# Patient Record
Sex: MALE | Race: WHITE | Employment: FULL TIME | URBAN - METROPOLITAN AREA
[De-identification: names, ages, dates, MRNs, and addresses within clinical notes are randomized per-mention and may not be internally consistent; named-entity substitution may affect disease eponyms.]

---

## 2018-08-23 ENCOUNTER — HOSPITAL ENCOUNTER (OUTPATIENT)
Dept: PHYSICAL THERAPY | Age: 38
Discharge: HOME | End: 2018-08-23
Payer: COMMERCIAL

## 2018-08-23 PROCEDURE — 97110 THERAPEUTIC EXERCISES: CPT

## 2018-08-23 PROCEDURE — 97161 PT EVAL LOW COMPLEX 20 MIN: CPT

## 2018-08-23 NOTE — PROGRESS NOTES
[address]  [facility] CLINIC [place] PHYSICAL THERAPY  [facility]  [address], [address] ,[address], [address]  Phone: [phone]  Fax: [phone] / [address] [address] PHYSICAL THERAPY SERVICES  Patient Name: Cuco Kirkpatrick : 1980   Medical   Diagnosis: Right knee pain [M25.561] Treatment Diagnosis: R knee pain   Onset Date: 2017     Referral Source: Tanisha Dee MD Start of [facility]): 2018   Prior Hospitalization: See medical history Provider #: [id number]   Prior Level of Function: Limited with ADLs due to pain   Comorbidities: R ankle tendonectoy Dec 2016, L shoulder labral repair    Medications: Verified on Patient Summary List   The Plan of Care and following information is based on the information from the initial evaluation.   ==================================================================================  Assessment / key information:  Patient is a 45 y.o. male who presents to [facility] with s/p R knee scope for partial medial meniscectomy & debridement of chondral lesion of [address] on 18 . Patient reports initial onset of sx possibly last 2017 when he fell in the shower at home. Patient reports sx are intermittent in nature with worsening of sx with overactivit & prolonged weightbearing. He DC use of crutches after 2 days. Sx improve with use ibuprofen/alleve or tylenol for pain relief as well as use of CP on a daily basis. Average reported pain level at 5/10, 5/10 at worst & 0/10 at best. Upon objective evaluation patient demonstrates R -2 deg of hyperextension and 90 deg of flexion as measured in supine, L knee -6 to 140 deg. Mild ERP with flexion>extension both active & passively. Mild TTP along medial joint line. MMT revealed overall R knee strength at 4/5 with no c/o pain upon MMT. Patient's surgical dressing was removed, sutures are intact & appear to be healing well.   Pt ambulates with shortened step length on R, v/c for TKE on R LE. He is able to neg 3 stairs with nonrec pattern & 1 HR for safety. Patient can benefit from PT interventions to improve ROM, strength, decrease pain & swelling & normalize gait mechanics, to facilitate ADLs & overall functional status.   ==================================================================================  Eval Complexity: History MEDIUM  Complexity : 1-2 comorbidities / personal factors will impact the outcome/ POC ;  Examination  MEDIUM Complexity : 3 Standardized tests and measures addressing body structure, function, activity limitation and / or participation in recreation ; Presentation LOW Complexity : Stable, uncomplicated ;  Decision Making MEDIUM Complexity : FOTO score of 26-74; Overall Complexity LOW   Problem List: pain affecting function, decrease ROM, decrease strength, edema affecting function, impaired gait/ balance, decrease ADL/ functional abilitiies, decrease activity tolerance, decrease flexibility/ joint mobility, decrease transfer abilities and other FOTO 32 points   Treatment Plan may include any combination of the following: Therapeutic exercise, Therapeutic activities, Neuromuscular re-education, Physical agent/modality, Gait/balance training, Manual therapy, Aquatic therapy, Patient education, Self Care training, Functional mobility training, Home safety training and Stair training  Patient / Family readiness to learn indicated by: asking questions, trying to perform skills and interest  Persons(s) to be included in education: patient (P)  Barriers to Learning/Limitations: None  Measures taken:    Patient Goal (s): \"range of motion, return to running\"   Patient self reported health status: good  Rehabilitation Potential: good   Short Term Goals: To be accomplished in  2  weeks:  1) Establish HEP to prevent further disability.     2) Patient will report decreased c/o pain to < or = 3-4/10 to facilitate return to pain-free sport with manageable sx in R knee. 3) Patient will improve R knee AROM to 115 degrees of flexion so ROM is available for dressing activities. 4) Improve FOTO score from 32 points to > or = 42 points indicating improved tolerance with ADLs in regards to work at [employer].  Long Term Goals: To be accomplished in  4  weeks:  1) Patient will improve R knee AROM to 135 degrees of flexion so ROM is available for dressing activities. 2) Improve overall strength of R knee to 5-/5 with no c/o pain upon MMT so strength is available for return to eventual sport. 3) Improve FOTO score from 42 points to > or = 60 points indicating improved tolerance with ADLs in regards to work at [employer]. 4) Patient to be able to perform FWD step down from 6 inch step with good pelvis/knee stability & no c/o pain in preparation for return to eventual sport. Frequency / Duration:   Patient to be seen  2  times per week for 4  weeks:  Patient / Caregiver education and instruction: self care, activity modification, brace/ splint application and exercises  G-Codes (GP): NA  Therapist Signature: TRACEY Barrios, cert MDT Date: 4/80/4418   Certification Period: NA Time: 2:46 PM   ===========================================================================================  I certify that the above Physical Therapy Services are being furnished while the patient is under my care. I agree with the treatment plan and certify that this therapy is necessary. Physician Signature:        Date:       Time:     Please sign and return to [facility] or you may fax the signed copy to [phone]. Thank you.

## 2018-08-23 NOTE — PROGRESS NOTES
PHYSICAL THERAPY - DAILY TREATMENT NOTE    Patient Name: Wes Rodriguez        Date: 2018  : 1980   YES Patient  Verified  Visit #:      12  Insurance: Payor: Cindy Orozco / [place] / Product Type: PPO /      In time: 2:40 P Out time: 3:35 P   Total Treatment Time: 55     Medicare Time Tracking (below)   Total Timed Codes (min):  NA 1:1 Treatment Time:  NA     TREATMENT AREA =  Right knee pain [M25.561]    SUBJECTIVE  Pain Level (on 0 to 10 scale):  0  / 10   Medication Changes/New allergies or changes in medical history, any new surgeries or procedures?     NO    If yes, update Summary List   Subjective Functional Status/Changes:  []  No changes reported     See POC          OBJECTIVE  Modalities Rationale:     decrease edema and decrease pain to improve patient's ability to return to pain-free ambulation    min [] Estim, type/location:                                      []  att     []  unatt     []  w/US     []  w/ice    []  w/heat    min []  Mechanical Traction: type/lbs                   []  pro   []  sup   []  int   []  cont    []  before manual    []  after manual    min []  Ultrasound, settings/location:      min []  Iontophoresis w/ dexamethasone, location:                                               []  take home patch       []  in clinic   10 min [x]  Ice     []  Heat    location/position: Supine to R knee     min []  Vasopneumatic Device, press/temp:     min []  Other:    [] Skin assessment post-treatment (if applicable):    []  intact    []  redness- no adverse reaction     []redness  adverse reaction:        15 min Therapeutic Exercise:  [x]  See flow sheet   Rationale:      increase ROM and increase strength to improve the patients ability to return to pain-free standing      min Manual Therapy:    Rationale:          min Therapeutic Activity:    Rationale:         min Neuromuscular Re-ed:    Rationale:       min Gait Training:    Rationale:       min Patient Education: YES  Reviewed HEP   []  Progressed/Changed HEP based on: Other Objective/Functional Measures:    See POC     Post Treatment Pain Level (on 0 to 10) scale:   0  / 10     ASSESSMENT  Assessment/Changes in Function:     See POC     []  See Progress Note/Recertification   Patient will continue to benefit from skilled PT services to modify and progress therapeutic interventions, address functional mobility deficits, address ROM deficits, address strength deficits and instruct in home and community integration to attain remaining goals. Progress toward goals / Updated goals:    Progressing towards goals established at [address] [facility] [address]  [x]  Upgrade activities as tolerated YES Continue plan of care   []  Discharge due to :    []  Other:      Therapist: Kendal Rivas PT    Date: 8/23/2018 Time: 3:29 PM     No future appointments.

## 2018-08-29 ENCOUNTER — HOSPITAL ENCOUNTER (OUTPATIENT)
Dept: PHYSICAL THERAPY | Age: 38
Discharge: HOME | End: 2018-08-29
Payer: COMMERCIAL

## 2018-08-29 PROCEDURE — 97110 THERAPEUTIC EXERCISES: CPT

## 2018-08-29 NOTE — PROGRESS NOTES
PHYSICAL THERAPY - DAILY TREATMENT NOTE Patient Name: Kathie Sanders        Date: 2018 : 1980   yes Patient  Verified Visit #:      12  Insurance: Payor: OPTIMA / [place] / Product Type: PPO / In time: 7:50A Out time: 8:40A Total Treatment Time: 50 Medicare/ BCBS Time Tracking (below) Total Timed Codes (min):  NA 1:1 Treatment Time:  NA  
 
TREATMENT AREA =  Right knee pain [M25.561] SUBJECTIVE Pain Level (on 0 to 10 scale):  5  / 10 Medication Changes/New allergies or changes in medical history, any new surgeries or procedures?    no  If yes, update Summary List  
Subjective Functional Status/Changes:  []  No changes reported \"I have to leave here by 840 to get to work\"  Pt reports walking > 18,000 steps this past weekend and had increased pain the previous night with poor ability to sleep due to pain. OBJECTIVE Modalities Rationale:     decrease inflammation and decrease pain to improve patient's ability to perform unlimited ADLs 
 min [] Estim, type/location:    
                                 []  att     []  unatt     []  w/US     []  w/ice    []  w/heat  
 min []  Mechanical Traction: type/lbs   
               []  pro   []  sup   []  int   []  cont    []  before manual    []  after manual  
 min []  Ultrasound, settings/location:    
 min []  Iontophoresis w/ dexamethasone, location:   
                                           []  take home patch       []  in clinic  
10 min [x]  Ice     []  Heat    location/position: r knee in supine with elevation  
 min []  Vasopneumatic Device, press/temp:   
 min []  Other:   
[] Skin assessment post-treatment (if applicable):   
[]  intact    []  redness- no adverse reaction    
[]redness  adverse reaction:     
 
40 min Therapeutic Exercise:  [x]  See flow sheet Rationale:      increase ROM, increase strength, improve coordination, improve balance and increase proprioception to improve the patients ability to perform unlimited ADLs  
 
 
 min Patient Education:  yes  Reviewed HEP; did not add new exercises to HEP, educated patient on importance of rest, PRICE principles for full and proper healing, use of cryotherapy throughout the day to decrease pain and swelling. Patient educated to decrease overall number of steps for next week to allow for adequate healing. []  Progressed/Changed HEP based on: Other Objective/Functional Measures: 
 
Initiated therex per flow sheet. Good SLR noted without lag today AAROM: -2 to 125 degrees. Post Treatment Pain Level (on 0 to 10) scale:   3  / 10 ASSESSMENT Assessment/Changes in Function:  
 
Good tolerance to therex with no increases in overall pain; good form noted throughout. Max cuing for proper quadriceps activation during step up and TKE. 
  
[]  See Progress Note/Recertification Patient will continue to benefit from skilled PT services to modify and progress therapeutic interventions, address functional mobility deficits, address ROM deficits, address strength deficits, analyze and address soft tissue restrictions, analyze and cue movement patterns, analyze and modify body mechanics/ergonomics, assess and modify postural abnormalities, address imbalance/dizziness and instruct in home and community integration to attain remaining goals. Progress toward goals / Updated goals: 
 
Min progress towards STG 2. MET STG 3. PLAN [x]  Upgrade activities as tolerated yes Continue plan of care  
[]  Discharge due to :   
[]  Other:   
 
Therapist: Kavon Quiroz PT, DPT Date: 8/29/2018 Time: 10:10 AM  
 
Future Appointments Date Time Provider Jeanine Lyles 9/6/2018 7:00 AM Bea Toney, PT [facility]

## 2018-09-06 ENCOUNTER — APPOINTMENT (OUTPATIENT)
Dept: PHYSICAL THERAPY | Age: 38
End: 2018-09-06

## 2018-10-11 NOTE — PROGRESS NOTES
[address]  [facility]  [facility] 
[address], [address] [place] [address], [address]  Phone: [phone]  Fax: [phone] DISCHARGE SUMMARY Patient Name: Mariza Spring : 1980 Treatment/Medical Diagnosis: Right knee pain [M25.561] Referral Source: Dian Oliver MD    
Date of Initial Visit: 18 Attended Visits: 2 Missed Visits: 1 SUMMARY OF TREATMENT Therapeutic exercise to increase strength, range of motion, balance, proprioception, coordination and gait mechanics. Modalities as needed for pain control. CURRENT STATUS Mr. Tina Srinivasan is s/p R knee scope and was last seen on 18 and did not come to remainder of f/u scheduled appointments. His current status is unknown and formal reassessment towards goals was unable to be made. Pt will be DC from PT due to attendance/compliance policy. RECOMMENDATIONS Discontinue therapy due to lack of attendance or compliance. If you have any questions/comments please contact us directly at [phone]. Thank you for allowing us to assist in the care of your patient. Therapist Signature: Brett Corea PT, DPT  Date: 10/11/18   Time: 10:31 AM

## 2024-10-18 DIAGNOSIS — M25.561 RIGHT KNEE PAIN, UNSPECIFIED CHRONICITY: Primary | ICD-10-CM

## 2024-10-24 SDOH — HEALTH STABILITY: PHYSICAL HEALTH: ON AVERAGE, HOW MANY DAYS PER WEEK DO YOU ENGAGE IN MODERATE TO STRENUOUS EXERCISE (LIKE A BRISK WALK)?: 5 DAYS

## 2024-10-24 SDOH — HEALTH STABILITY: PHYSICAL HEALTH: ON AVERAGE, HOW MANY MINUTES DO YOU ENGAGE IN EXERCISE AT THIS LEVEL?: 90 MIN

## 2024-10-25 ENCOUNTER — OFFICE VISIT (OUTPATIENT)
Age: 44
End: 2024-10-25

## 2024-10-25 VITALS — HEIGHT: 72 IN | WEIGHT: 235 LBS | BODY MASS INDEX: 31.83 KG/M2

## 2024-10-25 DIAGNOSIS — G89.29 CHRONIC PAIN OF LEFT KNEE: ICD-10-CM

## 2024-10-25 DIAGNOSIS — M17.12 PRIMARY OSTEOARTHRITIS OF LEFT KNEE: ICD-10-CM

## 2024-10-25 DIAGNOSIS — M17.11 PRIMARY OSTEOARTHRITIS OF RIGHT KNEE: ICD-10-CM

## 2024-10-25 DIAGNOSIS — M25.562 CHRONIC PAIN OF LEFT KNEE: ICD-10-CM

## 2024-10-25 DIAGNOSIS — M25.561 CHRONIC PAIN OF RIGHT KNEE: Primary | ICD-10-CM

## 2024-10-25 DIAGNOSIS — G89.29 CHRONIC PAIN OF RIGHT KNEE: Primary | ICD-10-CM

## 2024-10-25 RX ORDER — TRIAMCINOLONE ACETONIDE 40 MG/ML
40 INJECTION, SUSPENSION INTRA-ARTICULAR; INTRAMUSCULAR ONCE
Status: COMPLETED | OUTPATIENT
Start: 2024-10-25 | End: 2024-10-25

## 2024-10-25 RX ORDER — LIDOCAINE HYDROCHLORIDE 10 MG/ML
9 INJECTION, SOLUTION INFILTRATION; PERINEURAL ONCE
Status: COMPLETED | OUTPATIENT
Start: 2024-10-25 | End: 2024-10-25

## 2024-10-25 RX ADMIN — TRIAMCINOLONE ACETONIDE 40 MG: 40 INJECTION, SUSPENSION INTRA-ARTICULAR; INTRAMUSCULAR at 10:11

## 2024-10-25 RX ADMIN — LIDOCAINE HYDROCHLORIDE 9 ML: 10 INJECTION, SOLUTION INFILTRATION; PERINEURAL at 10:11

## 2024-10-25 RX ADMIN — LIDOCAINE HYDROCHLORIDE 9 ML: 10 INJECTION, SOLUTION INFILTRATION; PERINEURAL at 10:10

## 2024-10-25 NOTE — PROGRESS NOTES
Name: Maxx Kennedy    : 1980     [facility] [place]  [address],   [facility] [address]  Dept: [phone]  Dept Fax: [phone]     Chief Complaint   Patient presents with    Knee Pain     Right        Ht 1.829 m (6')   Wt 106.6 kg (235 lb)   BMI 31.87 kg/m²      Allergies   Allergen Reactions    Penicillins Hives, Nausea And Vomiting, Other (See Comments) and Rash     Patient reports a small rash as a child    Sulfamethoxazole-Trimethoprim Hives, Itching, Nausea And Vomiting, Other (See Comments) and Rash     Patient reports small rash as a child        Current Outpatient Medications   Medication Sig Dispense Refill    sertraline (ZOLOFT) 50 MG tablet Take 1 tablet every day by oral route for 90 days.       No current facility-administered medications for this visit.       There is no problem list on file for this patient.     Family History   Problem Relation Age of Onset    No Known Problems Mother     No Known Problems Father        Past Surgical History:   Procedure Laterality Date    KNEE SURGERY        History reviewed. No pertinent past medical history.     I have reviewed and agree with PFSH and ROS and intake form in chart and the record furthermore I have reviewed prior medical record(s) regarding this patients care during this appointment.     Review of Systems:   Patient is a pleasant appearing individual, appropriately dressed, well hydrated, well nourished, who is alert, appropriately oriented for age, and in no acute distress with a normal gait and normal affect who does not appear to be in any significant pain.    Physical Exam:  Left Knee -Decrease range of motion with flexion, Knee arc of greater than 50 degrees, Some crepitation, Grossly neurovascularly intact, Good cap refill, No skin lesion, Moderate swelling, some gross instability, Some quadriceps weakness Kellgren and Ankit at

## 2024-10-25 NOTE — PATIENT INSTRUCTIONS
You have knee pain and a fever or rash.     You have such bad pain that you cannot use your knee.   Watch closely for changes in your health, and be sure to contact your doctor if you have any problems.  Where can you learn more?  Go to https://www.Briefcase.net/patientEd and enter W187 to learn more about \"Knee Arthritis: Care Instructions.\"  Current as of: March 9, 2022               Content Version: 13.5  © 2006-2022 [employer].   Care instructions adapted under license by [employer]. If you have questions about a medical condition or this instruction, always ask your healthcare professional. [employer] disclaims any warranty or liability for your use of this information.       Knee Arthritis: Exercises  Introduction  Here are some examples of exercises for you to try. The exercises may be suggested for a condition or for rehabilitation. Start each exercise slowly. Ease off the exercises if you start to have pain.  You will be told when to start these exercises and which ones will work best for you.  How to do the exercises  Heel slide (ankles crossed)    Lie on your back with your knees bent.  Slide your heel back by bending your affected knee as far as you can. Then hook your other foot around your ankle to help pull your heel even farther back.  Hold for about 6 seconds.  Return to your starting position.  Repeat 8 to 12 times.  If you can, repeat these steps for your other knee.  Quad set    Sit or lie down on a firm surface or the floor with your affected leg straight. Place a small, rolled-up towel under your knee.  Tighten the thigh muscles of your straight leg by pressing the back of your knee down into the towel.  Hold for about 6 seconds, then rest.  Repeat 8 to 12 times.  It's a good idea to repeat these steps with your other leg.  Hip flexion (lying down, leg straight)    Lie on your back with your affected leg straight. You can bend your other leg, if that feels

## 2024-12-06 ENCOUNTER — OFFICE VISIT (OUTPATIENT)
Age: 44
End: 2024-12-06
Payer: COMMERCIAL

## 2024-12-06 DIAGNOSIS — F32.A DEPRESSION, UNSPECIFIED DEPRESSION TYPE: ICD-10-CM

## 2024-12-06 DIAGNOSIS — G89.29 CHRONIC PAIN OF RIGHT KNEE: Primary | ICD-10-CM

## 2024-12-06 DIAGNOSIS — M17.11 PRIMARY OSTEOARTHRITIS OF RIGHT KNEE: ICD-10-CM

## 2024-12-06 DIAGNOSIS — M25.561 CHRONIC PAIN OF RIGHT KNEE: Primary | ICD-10-CM

## 2024-12-06 PROCEDURE — 99214 OFFICE O/P EST MOD 30 MIN: CPT | Performed by: ORTHOPAEDIC SURGERY

## 2024-12-06 NOTE — PROGRESS NOTES
bleeding, infection, nerve damage, failure to improve, worsening of symptoms, morbidity, and mortality risks were explained. All questions were answered. Patient was told of no guarantees. Patient accepts all risks and benefits. A consent for surgery will be documented and signed by the patient or a legal guardian. All questions were answered. The procedure was explained in detail.     The patient was counseled about the risks of pastor Covid-19 during their perioperative period and any recovery window from their procedure. The patient was made aware that pastor Covid-19 may worsen their prognosis for recovering from their procedure and lend to a higher morbidity and/or mortality risk. All material risks, benefits, and reasonable alternatives including postponing the procedure were discussed. The patient DOES wish to proceed with their procedure at this time.     Encounter Diagnoses   Name Primary?    Chronic pain of right knee Yes    Primary osteoarthritis of right knee        HPI:  The patient is here with a chief complaint of right knee pain, throbbing, burning pain, diagnosed with osteoarthritis.  Failed conservative treatment.  Disproportionate for his age.  Injections have not helped, bone-on-bone arthritis.    Assessment/Plan:  Plan will be for right total knee replacement with a stem tibia because of his age.  [facility].    We will get him optimized by his family doctor and we will go from there.  If he gets worse, he is to give me a call.    As part of continued conservative pain management options the patient was advised to utilize Tylenol or OTC NSAIDS as long as it is not medically contraindicated.     Return to Office:    Follow-up and Dispositions    Return if symptoms worsen or fail to improve, for ALREADY FANNIE FOR SURGERY.        Scribed by Zaria Felix MA as dictated by Curry Brown MD.  Documentation, performed by, True and Accepted Curry Brown MD

## 2024-12-09 DIAGNOSIS — M17.11 PRIMARY OSTEOARTHRITIS OF RIGHT KNEE: Primary | ICD-10-CM

## 2025-04-07 DIAGNOSIS — M17.11 PRIMARY OSTEOARTHRITIS OF RIGHT KNEE: Primary | ICD-10-CM

## 2025-05-06 LAB
ANION GAP SERPL CALCULATED.3IONS-SCNC: 10 MMOL/L (ref 3–15)
BUN BLDV-MCNC: 12 MG/DL (ref 6–22)
CALCIUM SERPL-MCNC: 9.4 MG/DL (ref 8.4–10.5)
CHLORIDE BLD-SCNC: 102 MMOL/L (ref 98–110)
CO2: 27 MMOL/L (ref 20–32)
CREAT SERPL-MCNC: 0.9 MG/DL (ref 0.5–1.2)
GFR, ESTIMATED: >90 ML/MIN/1.73 SQ.M.
GLUCOSE: 80 MG/DL (ref 70–99)
HCT VFR BLD CALC: 44.7 % (ref 39.3–51.6)
HEMOGLOBIN: 15.5 G/DL (ref 13.1–17.2)
MCH RBC QN AUTO: 30 PG (ref 26–34)
MCHC RBC AUTO-ENTMCNC: 35 G/DL (ref 31–36)
MCV RBC AUTO: 86 FL (ref 80–95)
PDW BLD-RTO: 12.3 % (ref 10–15.5)
PLATELET # BLD: 254 K/UL (ref 140–440)
PMV BLD AUTO: 10.6 FL (ref 9–13)
POTASSIUM SERPL-SCNC: 4.3 MMOL/L (ref 3.5–5.5)
RBC # BLD: 5.18 M/UL (ref 3.8–5.8)
SODIUM BLD-SCNC: 139 MMOL/L (ref 133–145)
WBC # BLD: 6.6 K/UL (ref 4–11)

## 2025-05-07 LAB
CULTURE: NORMAL
MRSA SCREEN CULTURE: NORMAL

## 2025-05-08 DIAGNOSIS — G89.29 CHRONIC PAIN OF RIGHT KNEE: ICD-10-CM

## 2025-05-08 DIAGNOSIS — M17.11 PRIMARY OSTEOARTHRITIS OF RIGHT KNEE: ICD-10-CM

## 2025-05-08 DIAGNOSIS — M25.561 CHRONIC PAIN OF RIGHT KNEE: ICD-10-CM

## 2025-05-08 DIAGNOSIS — F32.A DEPRESSION, UNSPECIFIED DEPRESSION TYPE: ICD-10-CM

## 2025-05-09 ENCOUNTER — OFFICE VISIT (OUTPATIENT)
Age: 45
End: 2025-05-09
Payer: COMMERCIAL

## 2025-05-09 DIAGNOSIS — G89.29 CHRONIC PAIN OF RIGHT KNEE: ICD-10-CM

## 2025-05-09 DIAGNOSIS — M25.561 CHRONIC PAIN OF RIGHT KNEE: ICD-10-CM

## 2025-05-09 DIAGNOSIS — M17.11 OSTEOARTHRITIS OF RIGHT KNEE, UNSPECIFIED OSTEOARTHRITIS TYPE: Primary | ICD-10-CM

## 2025-05-09 DIAGNOSIS — F32.A DEPRESSION, UNSPECIFIED DEPRESSION TYPE: ICD-10-CM

## 2025-05-09 DIAGNOSIS — M17.11 PRIMARY OSTEOARTHRITIS OF RIGHT KNEE: ICD-10-CM

## 2025-05-09 PROCEDURE — 99214 OFFICE O/P EST MOD 30 MIN: CPT | Performed by: ORTHOPAEDIC SURGERY

## 2025-05-09 RX ORDER — ONDANSETRON 8 MG/1
8 TABLET, ORALLY DISINTEGRATING ORAL EVERY 8 HOURS PRN
Qty: 20 TABLET | Refills: 0 | Status: SHIPPED | OUTPATIENT
Start: 2025-05-09

## 2025-05-09 RX ORDER — BUPROPION HYDROCHLORIDE 150 MG/1
150 TABLET ORAL DAILY
COMMUNITY
Start: 2025-02-17

## 2025-05-09 RX ORDER — OXYCODONE AND ACETAMINOPHEN 5; 325 MG/1; MG/1
1 TABLET ORAL
Qty: 30 TABLET | Refills: 0 | Status: SHIPPED | OUTPATIENT
Start: 2025-05-09 | End: 2025-05-16

## 2025-05-09 RX ORDER — CLINDAMYCIN HYDROCHLORIDE 300 MG/1
300 CAPSULE ORAL EVERY 8 HOURS
Qty: 21 CAPSULE | Refills: 0 | Status: SHIPPED | OUTPATIENT
Start: 2025-05-09 | End: 2025-05-16

## 2025-05-09 RX ORDER — ASPIRIN 325 MG
325 TABLET ORAL 2 TIMES DAILY
Qty: 60 TABLET | Refills: 0 | Status: SHIPPED | OUTPATIENT
Start: 2025-05-09

## 2025-05-09 NOTE — PROGRESS NOTES
Name: Maxx Kennedy    : 1980     [facility] [place]  [address]  [address]  [place] [address]  Dept: [phone]  Dept Fax: [phone]   Chief Complaint   Patient presents with    Pre-op Exam    Knee Pain     There were no vitals taken for this visit.   Allergies   Allergen Reactions    Penicillins Hives, Nausea And Vomiting, Other (See Comments) and Rash     Patient reports a small rash as a child    Sulfamethoxazole-Trimethoprim Hives, Itching, Nausea And Vomiting, Other (See Comments) and Rash     Patient reports small rash as a child     Current Outpatient Medications   Medication Sig Dispense Refill    buPROPion (WELLBUTRIN XL) 150 MG extended release tablet Take 1 tablet by mouth daily      oxyCODONE-acetaminophen (PERCOCET) 5-325 MG per tablet Take 1 tablet by mouth every 4-6 hours as needed for Pain for up to 7 days. Do not start taking pain medication until after surgery! Max Daily Amount: 6 tablets 30 tablet 0    ondansetron (ZOFRAN-ODT) 8 MG TBDP disintegrating tablet Take 1 tablet by mouth every 8 hours as needed for Nausea or Vomiting TAKE EVERY 8 HOURS PRN NAUSEA / Do Not start until after surgery / dispense 20 tabs 20 tablet 0    aspirin 325 MG tablet Take 1 tablet by mouth in the morning and at bedtime DO NOT START UNTIL AFTER SURGERY / NO 90 DAY RX WILL BE PROVIDED AS PATIENT WILL ONLY TAKE THIS 30 DAYS POST SURGERY 60 tablet 0    clindamycin (CLEOCIN) 300 MG capsule Take 1 capsule by mouth every 8 (eight) hours for 7 days Start antibiotics after surgery 21 capsule 0    sertraline (ZOLOFT) 50 MG tablet Take 1 tablet every day by oral route for 90 days.       No current facility-administered medications for this visit.       There is no problem list on file for this patient.     Family History   Problem Relation Age of Onset    No Known Problems Mother     No Known Problems Father        Past

## 2025-05-12 DIAGNOSIS — Z96.651 STATUS POST TOTAL RIGHT KNEE REPLACEMENT: Primary | ICD-10-CM

## 2025-05-29 ENCOUNTER — TELEMEDICINE (OUTPATIENT)
Age: 45
End: 2025-05-29

## 2025-05-29 DIAGNOSIS — Z96.651 STATUS POST TOTAL RIGHT KNEE REPLACEMENT: Primary | ICD-10-CM

## 2025-05-29 DIAGNOSIS — M25.561 ACUTE PAIN OF RIGHT KNEE: ICD-10-CM

## 2025-05-29 PROCEDURE — 99024 POSTOP FOLLOW-UP VISIT: CPT

## 2025-05-29 NOTE — PROGRESS NOTES
Maxx Kennedy (:  1980) is a Established patient, evaluated via audio/visual telemedicine on 2025    [facility] OR[facility]OP[facility] SPORTS MEDICINE  [address], [address]  [facility] [address]  Dept: [phone]  Dept Fax: [phone]   Chief Complaint   Patient presents with    Post-Op Check     Right tkr     Patient-Reported Vitals  No data recorded   Allergies   Allergen Reactions    Penicillins Hives, Nausea And Vomiting, Other (See Comments) and Rash     Patient reports a small rash as a child    Sulfamethoxazole-Trimethoprim Hives, Itching, Nausea And Vomiting, Other (See Comments) and Rash     Patient reports small rash as a child     Current Outpatient Medications   Medication Sig Dispense Refill    buPROPion (WELLBUTRIN XL) 150 MG extended release tablet Take 1 tablet by mouth daily      ondansetron (ZOFRAN-ODT) 8 MG TBDP disintegrating tablet Take 1 tablet by mouth every 8 hours as needed for Nausea or Vomiting TAKE EVERY 8 HOURS PRN NAUSEA / Do Not start until after surgery / dispense 20 tabs 20 tablet 0    aspirin 325 MG tablet Take 1 tablet by mouth in the morning and at bedtime DO NOT START UNTIL AFTER SURGERY / NO 90 DAY RX WILL BE PROVIDED AS PATIENT WILL ONLY TAKE THIS 30 DAYS POST SURGERY 60 tablet 0    sertraline (ZOLOFT) 50 MG tablet Take 1 tablet every day by oral route for 90 days.       No current facility-administered medications for this visit.      There is no problem list on file for this patient.     Family History   Problem Relation Age of Onset    No Known Problems Mother     No Known Problems Father       Social History     Socioeconomic History    Marital status:      Spouse name: None    Number of children: None    Years of education: None    Highest education level: None   Tobacco Use    Smoking status: Never    Smokeless tobacco: Never   Substance and Sexual Activity    Alcohol use: Not Currently     Social

## 2025-06-23 ENCOUNTER — TELEMEDICINE (OUTPATIENT)
Age: 45
End: 2025-06-23

## 2025-06-23 DIAGNOSIS — Z96.651 STATUS POST TOTAL RIGHT KNEE REPLACEMENT: Primary | ICD-10-CM

## 2025-06-23 DIAGNOSIS — M25.561 ACUTE PAIN OF RIGHT KNEE: ICD-10-CM

## 2025-06-23 PROCEDURE — 99024 POSTOP FOLLOW-UP VISIT: CPT

## 2025-06-23 NOTE — PROGRESS NOTES
Name: Maxx Kennedy (:  1980) is a Established patient, presenting virtually for evaluation of the following:      [facility]  [address] [place] [address] A  [facility] [id number]  Dept: [phone]  Dept Fax: [phone]   Chief Complaint   Patient presents with    Knee Pain    Post-Op Check     RTKA     There were no vitals taken for this visit.   Allergies   Allergen Reactions    Penicillins Hives, Nausea And Vomiting, Other (See Comments) and Rash     Patient reports a small rash as a child    Sulfamethoxazole-Trimethoprim Hives, Itching, Nausea And Vomiting, Other (See Comments) and Rash     Patient reports small rash as a child     Current Outpatient Medications   Medication Sig Dispense Refill    buPROPion (WELLBUTRIN XL) 150 MG extended release tablet Take 1 tablet by mouth daily      ondansetron (ZOFRAN-ODT) 8 MG TBDP disintegrating tablet Take 1 tablet by mouth every 8 hours as needed for Nausea or Vomiting TAKE EVERY 8 HOURS PRN NAUSEA / Do Not start until after surgery / dispense 20 tabs 20 tablet 0    aspirin 325 MG tablet Take 1 tablet by mouth in the morning and at bedtime DO NOT START UNTIL AFTER SURGERY / NO 90 DAY RX WILL BE PROVIDED AS PATIENT WILL ONLY TAKE THIS 30 DAYS POST SURGERY 60 tablet 0    sertraline (ZOLOFT) 50 MG tablet Take 1 tablet every day by oral route for 90 days.       No current facility-administered medications for this visit.       There is no problem list on file for this patient.     Family History   Problem Relation Age of Onset    No Known Problems Mother     No Known Problems Father        Past Surgical History:   Procedure Laterality Date    KNEE SURGERY        No past medical history on file.     I have reviewed and agree with PFSH and ROS and intake form in chart and the record furthermore I have reviewed prior medical record(s) regarding this patients care during this

## 2025-06-23 NOTE — PATIENT INSTRUCTIONS
portion sizes. Try to stay at a weight that's healthy for you. Too much weight puts more stress on your new knee.  If your bowel movements are not regular right after surgery, try to avoid constipation and straining. Drink plenty of water. Your doctor may suggest fiber, a stool softener, or a mild laxative.  Medicines  Your doctor will tell you if and when you can restart your medicines. You will also get instructions about taking any new medicines.  If you stopped taking aspirin or some other blood thinner, your doctor will tell you when to start taking it again.  Your doctor may give you a blood-thinning medicine to prevent blood clots. If you take a blood thinner, be sure you get instructions about how to take your medicine safely. Blood thinners can cause serious bleeding problems. This medicine could be in pill form or as a shot (injection). If a shot is needed, your doctor will tell you how to do this.  Be safe with medicines. Read and follow all instructions on the label.  If you are not taking a prescription pain medicine, ask your doctor if you can take an over-the-counter medicine.  If the doctor gave you a prescription medicine for pain, take it as prescribed.  Plan to take your pain medicine 30 minutes before exercises. It is easier to prevent pain before it starts than to stop it after it has started.  Store your prescription pain medicines where no one else can get to them. When you are done using them, dispose of them quickly and safely. Your local pharmacy or hospital may have a drop-off site.  If you think your pain medicine is making you sick to your stomach:  Take your medicine after meals (unless your doctor has told you not to).  Ask your doctor for a different pain medicine.  If your doctor prescribed antibiotics, take them as directed. Do not stop taking them just because you feel better. You need to take the full course of antibiotics.  Incision care  If your doctor told you how to care for